# Patient Record
Sex: FEMALE | Race: WHITE | NOT HISPANIC OR LATINO | Employment: STUDENT | URBAN - METROPOLITAN AREA
[De-identification: names, ages, dates, MRNs, and addresses within clinical notes are randomized per-mention and may not be internally consistent; named-entity substitution may affect disease eponyms.]

---

## 2024-03-04 ENCOUNTER — OFFICE VISIT (OUTPATIENT)
Dept: CARDIOLOGY | Facility: CLINIC | Age: 24
End: 2024-03-04
Payer: MEDICAID

## 2024-03-04 VITALS
BODY MASS INDEX: 25.29 KG/M2 | DIASTOLIC BLOOD PRESSURE: 68 MMHG | HEIGHT: 65 IN | SYSTOLIC BLOOD PRESSURE: 114 MMHG | OXYGEN SATURATION: 98 % | HEART RATE: 83 BPM | WEIGHT: 151.8 LBS

## 2024-03-04 DIAGNOSIS — E78.00 HYPERCHOLESTEREMIA: Primary | ICD-10-CM

## 2024-03-04 PROCEDURE — 93000 ELECTROCARDIOGRAM COMPLETE: CPT | Performed by: PHYSICIAN ASSISTANT

## 2024-03-04 PROCEDURE — 99203 OFFICE O/P NEW LOW 30 MIN: CPT | Performed by: PHYSICIAN ASSISTANT

## 2024-03-04 NOTE — PROGRESS NOTES
[facility] at [facility]  INITIAL OFFICE CONSULT      Nabila Mcmahon  2000  PCP: Ronna Morgan APRN    SUBJECTIVE:   Nabila Mcmahon is a 23 y.o. 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  Patient reports her mother  in her 30s due to cardiac disease but also had a history of alcoholism.  She also reports her mother's family has a history of cardiomyopathy including her mother's mother as well.  In view of this she has been following with her primary care provider recently has had some laboratory data which revealed elevated cholesterol levels.  She was concerned about this along with her family history presents today for further evaluation.  She states that she has been doing quite well with no chest pain chest tightness dizziness near syncope or syncope.  She stays active but does not [employer] exercise regular basis.  She also just recently after having her cholesterol checked began to modify her diet she is cut out all red meat.  She has been eating fruits vegetables salads cutting down red meat she been eating fish and chicken.  She presents today for further cardiac evaluation regarding her elevated cholesterol level.      Cardiac PMH: (Old records have been reviewed and summarized below)  Dyslipidemia  Family history of cardiac disease  Tobacco use, vaping: Patient discontinued last year        Past Medical History, Past Surgical History, Family history, Social History, and Medications were all reviewed with the patient today and updated as necessary.     Current Outpatient Medications   Medication  Sig Dispense Refill    valACYclovir (VALTREX) 500 MG tablet Take 1 tablet by mouth Daily. 90 tablet 1    vitamin D3 125 MCG (5000 UT) capsule capsule Take 1 capsule by mouth Daily. 90 capsule 3     No current facility-administered medications for this visit.     No Known Allergies      Past Medical History:   Diagnosis Date    GERD (gastroesophageal reflux disease)     Headache     Herpes     genital    Urinary tract infection      Past Surgical History:   Procedure Laterality Date    TONSILLECTOMY Bilateral     WISDOM TOOTH EXTRACTION Bilateral      Family History   Problem Relation Age of Onset    Heart failure Mother     Heart disease Mother     Alcohol abuse Mother     Diabetes Father     Heart failure Maternal Grandfather     Alcohol abuse Maternal Grandfather     Diabetes Paternal Grandmother      Social History     Tobacco Use    Smoking status: Former     Current packs/day: 0.00     Types: Cigarettes     Quit date: 2023     Years since quittin.1     Passive exposure: Never    Smokeless tobacco: Never    Tobacco comments:     Use to vape   Substance Use Topics    Alcohol use: Yes     Alcohol/week: 2.0 standard drinks of alcohol     Types: 2 Glasses of wine per week       ROS:  Review of Symptoms:  General: no recent weight loss/gain, weakness or fatigue  Skin: no rashes, lumps, or other skin changes  HEENT: no dizziness, lightheadedness, or vision changes  Respiratory: no cough or hemoptysis  Cardiovascular: no palpitations, and tachycardia  Gastrointestinal: no black/tarry stools or diarrhea  Urinary: no change in frequency or urgency  Peripheral Vascular: no claudication or leg cramps  Musculoskeletal: no muscle or joint pain/stiffness  Psychiatric: no depression or excessive stress  Neurological: no sensory or motor loss, no syncope  Hematologic: no anemia, easy bruising or bleeding  Endocrine: no thyroid problems, nor heat or cold intolerance         PHYSICAL EXAM:   /68 (BP Location: Right  "arm, Patient Position: Sitting)   Pulse 83   Ht 165.1 cm (65\")   Wt 68.9 kg (151 lb 12.8 oz)   SpO2 98%   BMI 25.26 kg/m²      Wt Readings from Last 5 Encounters:   03/04/24 68.9 kg (151 lb 12.8 oz)   02/01/24 61.3 kg (135 lb 3.2 oz)     BP Readings from Last 5 Encounters:   03/04/24 114/68   02/01/24 108/70       General-Well Nourished, Well developed  Eyes - PERRLA  Neck- supple, No mass  CV- regular rate and rhythm, no MRG  Lung- clear bilaterally  Abd- soft, +BS  Musc/skel - Norm strength and range of motion  Skin- warm and dry  Neuro - Alert & Oriented x 3, appropriate mood.    Patient's external notes were reviewed.  Independent interpretation of test performed by another physician in facility were reviewed.  Outside laboratory data was also reviewed.    Medical problems and test results were reviewed with the patient today.     Results for orders placed or performed in visit on 02/01/24   CBC Auto Differential    Specimen: Blood   Result Value Ref Range    WBC 4.03 3.40 - 10.80 10*3/mm3    RBC 4.77 3.77 - 5.28 10*6/mm3    Hemoglobin 12.9 12.0 - 15.9 g/dL    Hematocrit 40.8 34.0 - 46.6 %    MCV 85.5 79.0 - 97.0 fL    MCH 27.0 26.6 - 33.0 pg    MCHC 31.6 31.5 - 35.7 g/dL    RDW 12.8 12.3 - 15.4 %    RDW-SD 39.7 37.0 - 54.0 fl    MPV 10.7 6.0 - 12.0 fL    Platelets 207 140 - 450 10*3/mm3    Neutrophil % 69.3 42.7 - 76.0 %    Lymphocyte % 20.3 19.6 - 45.3 %    Monocyte % 7.7 5.0 - 12.0 %    Eosinophil % 2.0 0.3 - 6.2 %    Basophil % 0.5 0.0 - 1.5 %    Immature Grans % 0.2 0.0 - 0.5 %    Neutrophils, Absolute 2.79 1.70 - 7.00 10*3/mm3    Lymphocytes, Absolute 0.82 0.70 - 3.10 10*3/mm3    Monocytes, Absolute 0.31 0.10 - 0.90 10*3/mm3    Eosinophils, Absolute 0.08 0.00 - 0.40 10*3/mm3    Basophils, Absolute 0.02 0.00 - 0.20 10*3/mm3    Immature Grans, Absolute 0.01 0.00 - 0.05 10*3/mm3    nRBC 0.0 0.0 - 0.2 /100 WBC   Comprehensive Metabolic Panel    Specimen: Blood   Result Value Ref Range    Glucose 85 65 - " 99 mg/dL    BUN 11 6 - 20 mg/dL    Creatinine 0.80 0.57 - 1.00 mg/dL    Sodium 139 136 - 145 mmol/L    Potassium 4.3 3.5 - 5.2 mmol/L    Chloride 103 98 - 107 mmol/L    CO2 22.4 22.0 - 29.0 mmol/L    Calcium 9.6 8.6 - 10.5 mg/dL    Total Protein 7.6 6.0 - 8.5 g/dL    Albumin 4.9 3.5 - 5.2 g/dL    ALT (SGPT) 12 1 - 33 U/L    AST (SGOT) 18 1 - 32 U/L    Alkaline Phosphatase 62 39 - 117 U/L    Total Bilirubin 0.7 0.0 - 1.2 mg/dL    Globulin 2.7 gm/dL    A/G Ratio 1.8 g/dL    BUN/Creatinine Ratio 13.8 7.0 - 25.0    Anion Gap 13.6 5.0 - 15.0 mmol/L    eGFR 106.3 >60.0 mL/min/1.73   Lipid Panel    Specimen: Blood   Result Value Ref Range    Total Cholesterol 234 (H) 0 - 200 mg/dL    Triglycerides 58 0 - 150 mg/dL    HDL Cholesterol 81 (H) 40 - 60 mg/dL    LDL Cholesterol  143 (H) 0 - 100 mg/dL    VLDL Cholesterol 10 5 - 40 mg/dL    LDL/HDL Ratio 1.75    Vitamin D,25-Hydroxy    Specimen: Blood   Result Value Ref Range    25 Hydroxy, Vitamin D 18.1 (L) 30.0 - 100.0 ng/ml   Hepatitis C Antibody    Specimen: Blood   Result Value Ref Range    Hepatitis C Ab Non-Reactive Non-Reactive   Hemoglobin A1c    Specimen: Blood   Result Value Ref Range    Hemoglobin A1C 5.00 4.80 - 5.60 %   TSH Rfx On Abnormal To Free T4    Specimen: Blood   Result Value Ref Range    TSH 0.623 0.270 - 4.200 uIU/mL         Lab Results   Component Value Date    CHOL 234 (H) 02/01/2024    HDL 81 (H) 02/01/2024     (H) 02/01/2024    VLDL 10 02/01/2024       EKG:  (EKG/Tracing has been independently visualized by me and summarized below)      ECG 12 Lead    Date/Time: 3/4/2024 10:16 AM  Performed by: Adalberto Rodrigues PA    Authorized by: Adalberto Rodrigues PA  Comparison: not compared with previous ECG   Rhythm: sinus arrhythmia  Rate: tachycardic  Conduction: conduction normal  QRS axis: normal    Clinical impression: normal ECG          ASSESSMENT   1.  Dyslipidemia: Patient has modified diet, Mediterranean style low red meat diet.  Increase  exercise.  Will recheck lipid panel in 3 months with LP(a) and CRP level.    2.  History of cardiac disease: We would like to gain further information score discussed with her aunt who may know her family history.  She does state her mother had alcoholism and she  her sleep there is was an autopsy performed.  She is unsure of her other family history.  She will try to further investigate this at this time.      PLAN  Continue Mediterranean style diet, recheck lipid panel 3 months with CRP and lipoprotein a.  We discussed option of starting medication such as a statin.  She elected for now try diet initially.  She will focus on her lifestyle avoid vaping, smoking increase exercise.  Follow-up regarding her family history determine if any further testing should be considered.  Return follow-up 3 months or sooner as needed.         Cardiology/Electrophysiology  24  10:14 EST  Electronically signed by RITO Booth, 24, 10:17 AM EST.

## 2024-03-15 ENCOUNTER — OFFICE VISIT (OUTPATIENT)
Age: 24
End: 2024-03-15
Payer: MEDICAID

## 2024-03-15 DIAGNOSIS — F41.9 ANXIETY DISORDER, UNSPECIFIED TYPE: ICD-10-CM

## 2024-03-15 DIAGNOSIS — F32.A DEPRESSION, UNSPECIFIED DEPRESSION TYPE: Primary | ICD-10-CM

## 2024-03-15 NOTE — PROGRESS NOTES
"     Initial Adult Note     Date:03/15/2024   Client Name: Nabila Mcmahon  : 2000   MRN: [id number]   Time IN: 2:15pm    Time OUT: 2:50pm     Referring Provider: Ronna Morgan APRN    Chief Complaint:      ICD-10-CM ICD-9-CM   1. Depression, unspecified depression type  F32.A 311   2. Anxiety disorder, unspecified type  F41.9 300.00        History of Present Illness:   Nabila Mcmahon is a 23 y.o. female who is being seen today for an initial psychotherapy counseling assessment for increasing awareness of depressive and anxious symptoms. Pt noted that periods of depression started about 2-3 years ago.  Pt notes that she will experience 1 week or more of increased depressive symptoms including numerous negative thoughts, lack of interest or energy in activities, isolating self from others, and feeling a \"gray cloud\" around her.  Pt reported feelings of anxiety related to new situations/environments exhibited through feelings of nervousness, worry and on occasion physical symptoms such as racing heart, breathlessness and chest pains.  Pt expressed difficulty with sleeping.  Pt states that she has noted symptoms have been present for several years but was unable to obtain services due to lack of health insurance.  Pt shared that she lived in [place] for several years during adolescence and moved around since then - most recently was living in [place] and moved to [place] in 2023.        Past Psychiatric History:   None noted    Objective     PHQ-9 Depression Screening  Little interest or pleasure in doing things? 1-->several days   Feeling down, depressed, or hopeless? 1-->several days   Trouble falling or staying asleep, or sleeping too much? 1-->several days   Feeling tired or having little energy? 1-->several days   Poor appetite or overeating? 0-->not at all   Feeling bad about yourself - or that you are a failure or have let yourself or your family down? 0-->not at all   Trouble concentrating on things, " such as reading the newspaper or watching television? 1-->several days   Moving or speaking so slowly that other people could have noticed? Or the opposite - being so fidgety or restless that you have been moving around a lot more than usual? 0-->not at all   Thoughts that you would be better off dead, or of hurting yourself in some way? 0-->not at all   PHQ-9 Total Score 5   If you checked off any problems, how difficult have these problems made it for you to do your work, take care of things at home, or get along with other people? somewhat difficult       JOHANNA-7  Feeling nervous, anxious or on edge: Nearly every day  Not being able to stop or control worrying: More than half the days  Worrying too much about different things: More than half the days  Trouble Relaxing: More than half the days  Being so restless that it is hard to sit still: Not at all  Feeling afraid as if something awful might happen: Not at all  Becoming easily annoyed or irritable: More than half the days  JOHANNA 7 Total Score: 11  If you checked any problems, how difficult have these problems made it for you to do your work, take care of things at home, or get along with other people: Very difficult    Interpersonal/Relational:  Marital Status:  - Nov 2023; in a relationship with  for 7-8 years, no kids  Support system: patient siblings - twin sister, lives locally      Work History:   Highest level of education obtained: college - Will graduate in June with Bachelors in Psychology from [employer]  Client's occupation: Not working at this time.   Ever been active duty in the ? no      Mental/Behavioral Health History:  Past diagnoses: None noted  History or Active MH treatment: None noted - PCP is not prescribing medications at this time for behavioral health issues.    Are there any significant health issues (current or past): Low Vit D level and was prescribed supplement.   History of seizures:  "no    Family Psychiatric History:  Mother - Bipolar, unsure of I or II    History of Substance Use:  Client History:  Pt reports less than 1 drink a week on average.  Pt reports quitting vaping in .  Denied history of or current use of tobacco use.  Pt reported history of use of marijuana during younger years; denied current use.  Pt denied current or history of use of other illicit drigs.  Caffeine intake in minimal.    Family History: Pt reported mother and maternal grandfather were alcoholics.       Lifestyle:  Current hobbies include:Pt enjoys reading and doing arts/crafts.  Pt and  enjoy cooking, playing tennis and going on walks.  Pt and sister enjoy shopping together and doing arts/crafts.     Strengths/Current Coping Strategies: Pt has utilized journaling and engaging in self-affirmations as \"preventative\" measures when noticing onset of negative thought patterns.  Pt states that she has been unable to identify coping strategies to assist \"in the moment\" of depressed state.      Significant Life Events:   Verbal, physical, sexual abuse? no  Has client experienced a death / loss of relationship? Yes - Mother passed away in .  Pt reports that relationship with aunt (maternal side) has dwindled since moving away and is not as close as they once were).    Has client experienced a major accident or tragic events? no    Triggers: (Persons/Places/Things/Events/Thought/Emotions): Yelling (father)    Legal History:  The patient has no significant history of legal issues.    Social History:   Social History     Socioeconomic History    Marital status:    Tobacco Use    Smoking status: Former     Current packs/day: 0.00     Types: Cigarettes     Quit date: 2023     Years since quittin.2     Passive exposure: Never    Smokeless tobacco: Never    Tobacco comments:     Use to vape   Vaping Use    Vaping status: Never Used   Substance and Sexual Activity    Alcohol use: Yes     Alcohol/week: " 2.0 standard drinks of alcohol     Types: 2 Glasses of wine per week    Drug use: Never     Types: Marijuana    Sexual activity: Yes     Partners: Male     Birth control/protection: Condom        Past Medical History:   Past Medical History:   Diagnosis Date    GERD (gastroesophageal reflux disease)     Headache     Herpes     genital    Urinary tract infection        Past Surgical History:   Past Surgical History:   Procedure Laterality Date    TONSILLECTOMY Bilateral     WISDOM TOOTH EXTRACTION Bilateral        Family History:   Family History   Problem Relation Age of Onset    Heart failure Mother     Heart disease Mother     Alcohol abuse Mother     Diabetes Father     Heart failure Maternal Grandfather     Alcohol abuse Maternal Grandfather     Diabetes Paternal Grandmother        Medications:     Current Outpatient Medications:     valACYclovir (VALTREX) 500 MG tablet, Take 1 tablet by mouth Daily., Disp: 90 tablet, Rfl: 1    vitamin D3 125 MCG (5000 UT) capsule capsule, Take 1 capsule by mouth Daily., Disp: 90 capsule, Rfl: 3    Allergies:   No Known Allergies    Subjective     Mental Status Exam:   MENTAL STATUS EXAM   General Appearance:  Cleanly groomed and dressed  Eye Contact:  Good eye contact  Attitude:  Cooperative  Motor Activity:  Normal gait, posture  Speech:  Normal rate, tone, volume  Mood and affect:  Normal, pleasant  Hopelessness:  2  Loneliness: Denies  Thought Process:  Logical and goal-directed  Associations/ Thought Content:  No delusions  Hallucinations:  None  Suicidal Ideations:  Not present  Homicidal Ideation:  Not present  Sensorium:  Alert  Orientation:  Person, time, situation and place  Immediate Recall, Recent, and Remote Memory:  Intact  Attention Span/ Concentration:  Good  Insight:  Good  Judgement:  Good  Reliability:  Good  Impulse Control:  Good      Assessment / Plan      Visit Diagnosis/Orders Placed This Visit:    ICD-10-CM ICD-9-CM   1. Depression, unspecified  "depression type  F32.A 311   2. Anxiety disorder, unspecified type  F41.9 300.00        SUICIDE RISK ASSESSMENT/CSSRS:  1. Does client have thoughts of suicide? Yes Pt acknowledged thoughts of \"being better of dead\" or \"would anyone care\" but denied that thoughts have lead to intention or plan.    2. Does client have intent for suicide? Patient denies   3. Does client have a current plan for suicide? Patient denies   4. History of suicide attempts: Patient denies   5. Family history of suicide or attempts: Patient denies   6. History of violent behaviors towards others or property or thoughts of committing suicide: Patient denies   7. History of sexual aggression toward others: Patient denies   8. Access to firearms or weapons: Patient denies     PLAN:  Safety: No acute safety concerns  Risk Assessment: Risk of self-harm acutely is low. Risk of self-harm chronically is also low, but could be further elevated in the event of treatment noncompliance and/or AODA.    Treatment Plan/ Short and Long Term Goals: Continue supportive psychotherapy efforts and medications as indicated. Treatment options discussed during today's visit. Client acknowledged and verbally consented to continue with current treatment plan and was educated on the importance of compliance with treatment and follow-up appointments. Client seems reasonably able to adhere to treatment plan.      Assisted client in processing above session content; acknowledged and normalized client’s thoughts, feelings, and concerns.  Rationalized client's thought process regarding awareness of depressive and anxiety symptoms. Pt and therapist discussed goals of care to include develop of nonjudgmental awareness of emotions.  Therapist and pt will explore awareness of cognitive distortions and develop challenges to assist with development of more helpful thought patterns.  Pt and therapist will engage in behavioral activation strategies to assist with depressive " regulation.  Therapist and pt will explore and developing coping and distress tolerance skills to assist with emotional regulation during heightened emotional states.        Allowed client to freely discuss issues without interruption or judgement with unconditional positive regard, active listening skills, and empathy. Clinician provided a safe, confidential environment to facilitate the development of a positive therapeutic relationship and encouraged open, honest communication. Assisted client in identifying risk factors which would indicate the need for higher level of care including thoughts to harm self or others and/or self-harming behavior and encouraged client to contact this office, call 911, or present to the nearest emergency room should any of these events occur. Discussed crisis intervention services and means to access. Client adamantly and convincingly denies current suicidal or homicidal ideation or perceptual disturbance. Assisted client in processing session content; acknowledged and normalized client’s thoughts, feelings, and concerns by utilizing a person-centered approach in efforts to build appropriate rapport and a positive therapeutic relationship with open and honest communication.     Quality Measures:     TOBACCO USE:  Tobacco Use: Medium Risk (3/4/2024)    Patient History     Smoking Tobacco Use: Former     Smokeless Tobacco Use: Never     Passive Exposure: Never      Former smoker    I advised Nabila of the risks of tobacco use.     Follow Up:   Return in about 3 weeks (around 4/5/2024).      Sydney Norman LCSW  [facility] [address]

## 2024-04-05 ENCOUNTER — OFFICE VISIT (OUTPATIENT)
Age: 24
End: 2024-04-05
Payer: MEDICAID

## 2024-04-05 DIAGNOSIS — F41.1 GENERALIZED ANXIETY DISORDER: ICD-10-CM

## 2024-04-05 DIAGNOSIS — F33.0 MILD EPISODE OF RECURRENT MAJOR DEPRESSIVE DISORDER: Primary | ICD-10-CM

## 2024-04-05 NOTE — PROGRESS NOTES
"     Follow Up Adult Note     Date:2024   Client Name: Nabila Mcmahon  : 2000   MRN: [id number]   Time IN: 1:30pm    Time OUT: 2:12pm     Referring Provider: Ronna Morgan APRN    Chief Complaint:      ICD-10-CM ICD-9-CM   1. Mild episode of recurrent major depressive disorder  F33.0 296.31   2. Generalized anxiety disorder  F41.1 300.02        History of Present Illness:   Nabila Mcmahon is a 23 y.o. female who is being seen today for follow up individual psychotherapy counseling ongoing depression and anxiety symptoms.  Pt shared that she recently noticed significant anxiety during encounters with her in-laws.  Pt reportedly difficulty with initiating conversations with family members and feeling uncomfortable without her  present.  Pt acknowledged self-defeating or thoughts of comparison as catalyst for anxiety. Pt states that feelings of anxiety are generally exhibited through racing thoughts, shortness of breath, and chest pressure.  Pt states that she feels \"frozen\" and unable to speak.         Objective     PHQ-9 Depression Screening  Little interest or pleasure in doing things? 1-->several days   Feeling down, depressed, or hopeless? 1-->several days   Trouble falling or staying asleep, or sleeping too much? 1-->several days   Feeling tired or having little energy? 1-->several days   Poor appetite or overeating? 0-->not at all   Feeling bad about yourself - or that you are a failure or have let yourself or your family down? 1-->several days   Trouble concentrating on things, such as reading the newspaper or watching television? 1-->several days   Moving or speaking so slowly that other people could have noticed? Or the opposite - being so fidgety or restless that you have been moving around a lot more than usual? 0-->not at all   Thoughts that you would be better off dead, or of hurting yourself in some way? 0-->not at all   PHQ-9 Total Score 6   If you checked off any problems, how " difficult have these problems made it for you to do your work, take care of things at home, or get along with other people? not difficult at all      JOHANNA-7  Feeling nervous, anxious or on edge: More than half the days  Not being able to stop or control worrying: More than half the days  Worrying too much about different things: More than half the days  Trouble Relaxing: Several days  Being so restless that it is hard to sit still: Several days  Feeling afraid as if something awful might happen: Not at all  Becoming easily annoyed or irritable: Nearly every day  JOHANNA 7 Total Score: 11  If you checked any problems, how difficult have these problems made it for you to do your work, take care of things at home, or get along with other people: Somewhat difficult      Medications:     Current Outpatient Medications:     valACYclovir (VALTREX) 500 MG tablet, Take 1 tablet by mouth Daily., Disp: 90 tablet, Rfl: 1    vitamin D3 125 MCG (5000 UT) capsule capsule, Take 1 capsule by mouth Daily., Disp: 90 capsule, Rfl: 3    Allergies:   No Known Allergies      Subjective     Mental Status Exam:   MENTAL STATUS EXAM   General Appearance:  Cleanly groomed and dressed  Eye Contact:  Good eye contact  Attitude:  Cooperative and polite  Motor Activity:  Normal gait, posture  Speech:  Normal rate, tone, volume  Mood and affect:  Normal, pleasant  Thought Process:  Logical and goal-directed  Associations/ Thought Content:  No delusions  Hallucinations:  None  Suicidal Ideations:  Not present  Homicidal Ideation:  Not present  Sensorium:  Alert and clear  Orientation:  Person, place, time and situation  Immediate Recall, Recent, and Remote Memory:  Intact  Attention Span/ Concentration:  Good  Insight:  Good  Judgement:  Good  Reliability:  Good  Impulse Control:  Good       Client's Support Network Includes:      Functional Status: Mild impairment     Progress toward goal: Not at goal    Prognosis: Good with Ongoing Treatment  "    Assessment / Plan / Progress     Visit Diagnosis/Orders Placed This Visit:    ICD-10-CM ICD-9-CM   1. Mild episode of recurrent major depressive disorder  F33.0 296.31   2. Generalized anxiety disorder  F41.1 300.02        SUICIDE RISK ASSESSMENT/CSSRS:  1. Does client have thoughts of suicide? Patient denies  2. Does client have intent for suicide? Patient denies  3. Does client have a current plan for suicide? Patient denies   4. History of suicide attempts: Patient denies   5. Family history of suicide or attempts: Patient denies   6. History of violent behaviors towards others or property or thoughts of committing suicide: Patient denies   7. History of sexual aggression toward others: Patient denies   8. Access to firearms or weapons: Patient denies     PLAN:  Safety: No acute safety concerns  Risk Assessment: Risk of self-harm acutely is low. Risk of self-harm chronically is also low, but could be further elevated in the event of treatment noncompliance and/or AODA.    Treatment Plan/Goals & Progress: Continue supportive psychotherapy efforts and medications as indicated. Treatment options discussed during today's visit. Client ackowledged and verbally consented to continue with current treatment plan and was educated on the importance of compliance with treatment and follow-up appointments. Client seems reasonably able to adhere to treatment plan.      Assisted client in processing above session content; acknowledged and normalized client’s thoughts, feelings, and concerns.  Rationalized client's thought process regarding awareness of anxiety and thought processing of recent encounter. Pt and therapist explored pt's underlying thoughts/beliefs associated with anxious thoughts and fear of conversating with others.  Pt acknowledged awareness of \"different mask\" presented to family as opposed to genuine self. Therapist provided psycho education on automatic stress response system and development of grounding " skills based on 5 senses to gain control of stressed state. Therapist and pt then discussed cognitive distortions and challenges to present to self to more cognitively think through processing of anxious situation. Therapist suggested pt consider fear hierarchy to gradually expose self to encounters to develop awareness of anxiety and gain confidence to support pt's positive beliefs in self. Therapist and pt discussed pt's awareness of increasing self graciousness that has developed over time and how that has been beneficial to accepting pt's states of anxiety and depression. Therapist introduced and discussed concept of inner critic vs inner  to further support pt's awareness of positive self-development.     Allowed client to freely discuss issues without interruption or judgement with unconditional positive regard, active listening skills, and empathy. Clinician provided a safe, confidential environment to facilitate the development of a positive therapeutic relationship and encouraged open, honest communication. Assisted client in identifying risk factors which would indicate the need for higher level of care including thoughts to harm self or others and/or self-harming behavior and encouraged client to contact this office, call 911, or present to the nearest emergency room should any of these events occur. Discussed crisis intervention services and means to access. Client adamantly and convincingly denies current suicidal or homicidal ideation or perceptual disturbance. Assisted client in processing session content; acknowledged and normalized client’s thoughts, feelings, and concerns by utilizing a person-centered approach in efforts to build appropriate rapport and a positive therapeutic relationship with open and honest communication.     Quality Measures:     TOBACCO USE:  Tobacco Use: Medium Risk (3/4/2024)    Patient History     Smoking Tobacco Use: Former     Smokeless Tobacco Use: Never     Passive  Exposure: Never      Former smoker    I advised Nabila of the risks of tobacco use.     Follow Up:   Return in about 1 month (around 5/5/2024).    Sydney Norman LCSW  [facility]

## 2024-06-11 PROBLEM — B00.9 HSV (HERPES SIMPLEX VIRUS) INFECTION: Status: ACTIVE | Noted: 2024-06-11

## 2024-06-11 PROBLEM — Z01.419 NORMAL GYNECOLOGIC EXAMINATION: Status: ACTIVE | Noted: 2024-06-11

## 2024-06-11 NOTE — PROGRESS NOTES
Subjective   Chief Complaint   Patient presents with    Gynecologic Exam     Nabila Mcmahon is a 23 y.o. year old  presenting to be seen for her annual exam.  She has not had a Pap in the past.  She is interested in discussing contraceptive methods.  She is currently using condoms.  She has history of hsv, takes acyclovir for suppression. She is unsure if she has had her hpv vaccine series.   She lives here in [place]. 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    Her  is a .   SEXUAL Hx:  She is currently sexually active.  In the past year there there has been NO new sexual partners.    Condoms are always used.  She would like to be screened for STD's at today's exam.  Current birth control method: condoms.  She is happy with her current method of contraception and does not want to discuss alternative methods of contraception.  MENSTRUAL Hx:  Patient's last menstrual period was 2024.  In the past 6 months her cycles have been regular, predictable and occur monthly.  Her menstrual flow is typically normal.   Each month on average there are roughly 0 day(s) of very heavy flow.    Intermenstrual bleeding is absent.    Post-coital bleeding is absent.  Dysmenorrhea: mild and is not affecting her activities of daily living  PMS: moderate and is not affecting her activities of daily living  Her cycles are not a source of concern for her that she wishes to discuss today.  HEALTH Hx:  She exercises regularly: yes. Walking daily   She wears her seat belt: yes.  She has concerns about domestic violence: no.  OTHER THINGS SHE WANTS TO DISCUSS TODAY:  Nothing else    The following portions of the patient's history were reviewed and updated as appropriate:problem list, current medications, allergies, past family history, past medical history, past social history, and past surgical history.    Social History    Tobacco Use      Smoking status: Former        Packs/day:  0.00        Types: Cigarettes        Quit date: 2023        Years since quittin.4        Passive exposure: Never      Smokeless tobacco: Never      Tobacco comments: Use to vape    Review of Systems  Constitutional POS: nothing reported    NEG: anorexia or night sweats   Genitourinary POS: nothing reported    NEG: dysuria or hematuria      Gastointestinal POS: nothing reported    NEG: bloating, change in bowel habits, melena, or reflux symptoms   Integument POS: nothing reported    NEG: moles that are changing in size, shape, color or rashes   Breast POS: nothing reported    NEG: persistent breast lump, skin dimpling, or nipple discharge        Objective   /70   Resp 14   Wt 64 kg (141 lb)   LMP 2024   BMI 23.46 kg/m²     General:  well developed; well nourished  no acute distress   Skin:  No suspicious lesions seen   Thyroid: normal to inspection and palpation   Breasts:  Examined in supine position  Symmetric without masses or skin dimpling  Nipples normal without inversion, lesions or discharge  There are no palpable axillary nodes   Abdomen: soft, non-tender; no masses  no umbilical or inguinal hernias are present  no hepato-splenomegaly   Pelvis: Clinical staff was present for exam  :  urethral meatus normal;  Vaginal:  normal pink mucosa without prolapse or lesions.  Cervix:  normal appearance.  Uterus:  normal size, shape and consistency.  Adnexa:  normal bimanual exam of the adnexa.  Rectal:  digital rectal exam not performed; anus visually normal appearing.        Assessment   Well woman with routine gynecological exam  HSV-on valacyclovir for suppression PCP refills this medication.     Plan     Pap and STD testing was done today.  If she does not receive the results of the Pap within 2 weeks  time, she was instructed to call to find out the results.  I explained to Nabila that the recommendations for Pap smear interval in a low risk patient's has lengthened to 3 years time.  I  encouraged her to be seen yearly for a full physical exam including breast and pelvic exam even during the off years when PAP's will not be performed.  No prescription was given or electronically sent at today's visit  The importance of keeping all planned follow-up and taking all medications as prescribed was emphasized.  Today I discussed with Nabila the total recommended calcium intake for a premenopausal female is 1000 mg.  Ideally this should be from dietary sources.  I reviewed calcium content in various foods including milk, fortified orange juice and yogurt.  If she cannot get sufficient calcium through dietary means, it is recommended to supplement with either a multivitamin or calcium to reach her daily goal.  I also reviewed the difference in the bioavailability of calcium carbonate and calcium citrate containing supplements and the importance of taking calcium carbonate containing products with food.  Finally, vitamin D's role in calcium absorption was reviewed and a total daily vitamin D intake of 800 units was recommended.  I discussed with Nabila that she may be behind on needed vaccinations for HPV (Gardasil-9).  She may be able to obtain these vaccinations at her local pharmacy OR speak about obtaining them with her primary care.  If she does obtain her vaccines, I have asked Nabila to let us know the date each vaccine was obtained so that her medical record could be updated in our system.  Follow up for annual exam 1 year    No orders of the defined types were placed in this encounter.         This note was electronically signed.    Siria Mcdonald, ROD  June 14, 2024

## 2024-06-14 ENCOUNTER — OFFICE VISIT (OUTPATIENT)
Dept: OBSTETRICS AND GYNECOLOGY | Facility: CLINIC | Age: 24
End: 2024-06-14
Payer: MEDICAID

## 2024-06-14 VITALS
RESPIRATION RATE: 14 BRPM | DIASTOLIC BLOOD PRESSURE: 70 MMHG | WEIGHT: 141 LBS | BODY MASS INDEX: 23.46 KG/M2 | SYSTOLIC BLOOD PRESSURE: 118 MMHG

## 2024-06-14 DIAGNOSIS — Z01.419 WELL WOMAN EXAM WITH ROUTINE GYNECOLOGICAL EXAM: Primary | ICD-10-CM

## 2024-06-21 LAB — REF LAB TEST METHOD: NORMAL

## 2024-07-27 DIAGNOSIS — A60.09 HERPES GENITALIS IN WOMEN: ICD-10-CM

## 2024-07-29 RX ORDER — VALACYCLOVIR HYDROCHLORIDE 500 MG/1
500 TABLET, FILM COATED ORAL DAILY
Qty: 90 TABLET | Refills: 4 | Status: SHIPPED | OUTPATIENT
Start: 2024-07-29

## 2024-07-30 RX ORDER — VALACYCLOVIR HYDROCHLORIDE 500 MG/1
500 TABLET, FILM COATED ORAL DAILY
Qty: 90 TABLET | Refills: 1 | OUTPATIENT
Start: 2024-07-30

## 2024-10-15 ENCOUNTER — OFFICE VISIT (OUTPATIENT)
Age: 24
End: 2024-10-15
Payer: MEDICAID

## 2024-10-15 DIAGNOSIS — F33.0 MILD EPISODE OF RECURRENT MAJOR DEPRESSIVE DISORDER: Primary | ICD-10-CM

## 2024-10-15 DIAGNOSIS — F41.1 GENERALIZED ANXIETY DISORDER: ICD-10-CM

## 2024-10-15 NOTE — TREATMENT PLAN
Multi-Disciplinary Problems (from Behavioral Health Treatment Plan)      Active Problems       Problem: Anxiety  Start Date: 10/15/24      Problem Details: The patient self-scales this problem as a 9 with 10 being the worst.          Goal Priority Start Date Expected End Date End Date    Patient will develop and implement behavioral and cognitive strategies to reduce anxiety and irrational fears. -- 10/15/24 04/15/25 --    Goal Details: Progress toward goal:  Not appropriate to rate progress toward goal since this is the initial treatment plan.          Goal Intervention Frequency Start Date End Date    Help patient explore past emotional issues in relation to present anxiety. Q3 Months 10/15/24 --    Intervention Details: Duration of treatment until discharged.          Goal Intervention Frequency Start Date End Date    Help patient develop an awareness of their cognitive and physical responses to anxiety. Q3 Months 10/15/24 --    Intervention Details: Duration of treatment until discharged.                  Problem: Depression  Start Date: 10/15/24      Problem Details: The patient self-scales this problem as a 7 with 10 being the worst.          Goal Priority Start Date Expected End Date End Date    Patient will demonstrate the ability to initiate new constructive life skills outside of sessions on a consistent basis. -- 10/15/24 04/15/25 --    Goal Details: Progress toward goal:  Not appropriate to rate progress toward goal since this is the initial treatment plan.          Goal Intervention Frequency Start Date End Date    Assist patient in setting attainable activities of daily living goals. Q3 Months 10/15/24 --      Goal Intervention Frequency Start Date End Date    Provide education about depression Q3 Months 10/15/24 --    Intervention Details: Duration of treatment until discharged.          Goal Intervention Frequency Start Date End Date    Assist patient in developing healthy coping strategies. Q3  Months 10/15/24 --    Intervention Details: Duration of treatment until discharged.                  Problem: Self Esteem  Start Date: 10/15/24      Problem Details: The patient self-scales this problem as a 7 with 10 being the worst.          Goal Priority Start Date Expected End Date End Date    Patient will demonstrate the ability to internalize positive cognitive messages that is also reflected in behavioral changes. -- 10/15/24 04/15/25 --    Goal Details: Progress toward goal:  Not appropriate to rate progress toward goal since this is the initial treatment plan.          Goal Intervention Frequency Start Date End Date    Assist patient in identifying distorted negative beliefs about self and the world. Q3 Months 10/15/24 --    Intervention Details: Duration of treatment until discharged.          Goal Intervention Frequency Start Date End Date    Reinforce use of more realistic positive messages about to self in interpreting life events. Q3 Months 10/15/24 --    Intervention Details: Duration of treatment until discharged.                          Reviewed By       Sydney Norman LCSW 10/15/24 [id number]                     I have discussed and reviewed this treatment plan with the patient.

## 2024-10-15 NOTE — PROGRESS NOTES
Follow Up Adult Note     Date:10/15/2024   Client Name: Nabila Mcmahon  : 2000   MRN: [id number]   Time IN: 11:00 am    Time OUT: 11:49 am     Referring Provider: Ronna Morgan APRN    Chief Complaint:      ICD-10-CM ICD-9-CM   1. Mild episode of recurrent major depressive disorder  F33.0 296.31   2. Generalized anxiety disorder  F41.1 300.02        History of Present Illness:   Nabila Mcmahon is a 24 y.o. female who is being seen today for follow up individual psychotherapy counseling due to increasing symptoms of anxiety and depression. Pt reports that she has noted an increase in racing thoughts, inability to control worry, feeling nauseous or ill and feeling down/depressed for past few months. Pt acknowledged that she believes that symptoms are connected to difficult job situation which she just resigned. Pt acknowledged ongoing difficulties with social settings and with communicating openly with others. Pt acknowledged feelings of self-doubt and negative self-talk that contributes towards low feelings of self and sadness.       Objective     Medications:     Current Outpatient Medications:     valACYclovir (Valtrex) 500 MG tablet, Take 1 tablet by mouth Daily., Disp: 90 tablet, Rfl: 4    vitamin D3 125 MCG (5000 UT) capsule capsule, Take 1 capsule by mouth Daily., Disp: 90 capsule, Rfl: 3    Allergies:   No Known Allergies    Mental Status Exam:   MENTAL STATUS EXAM   General Appearance:  Cleanly groomed and dressed  Eye Contact:  Good eye contact  Attitude:  Cooperative and polite  Motor Activity:  Normal gait, posture  Speech:  Normal rate, tone, volume  Mood and affect:  Normal, pleasant  Thought Process:  Logical, goal-directed and linear  Associations/ Thought Content:  No delusions  Hallucinations:  None  Suicidal Ideations:  Not present  Homicidal Ideation:  Not present  Sensorium:  Alert  Orientation:  Person, place, time and situation  Immediate Recall, Recent, and Remote Memory:   Intact  Attention Span/ Concentration:  Good  Insight:  Good  Judgement:  Good  Reliability:  Good  Impulse Control:  Good       SUICIDE RISK ASSESSMENT/CSSRS:  1. Does client have thoughts of suicide? Patient denies  2. Does client have intent for suicide? Patient denies  3. Does client have a current plan for suicide? Patient denies   4. History of suicide attempts: Patient denies   5. Family history of suicide or attempts: Patient denies   6. History of violent behaviors towards others or property or thoughts of committing suicide: Patient denies   7. History of sexual aggression toward others: Patient denies   8. Access to firearms or weapons: Patient denies     Subjective     PHQ-9 Depression Screening  Little interest or pleasure in doing things? (Patient-Rptd) Several days   Feeling down, depressed, or hopeless? (Patient-Rptd) Over half   Trouble falling or staying asleep, or sleeping too much? (Patient-Rptd) Not at all   Feeling tired or having little energy? (Patient-Rptd) Several days   Poor appetite or overeating? (Patient-Rptd) Not at all   Feeling bad about yourself - or that you are a failure or have let yourself or your family down? (Patient-Rptd) Almost all   Trouble concentrating on things, such as reading the newspaper or watching television? (Patient-Rptd) Almost all   Moving or speaking so slowly that other people could have noticed? Or the opposite - being so fidgety or restless that you have been moving around a lot more than usual? (Patient-Rptd) Not at all   Thoughts that you would be better off dead, or of hurting yourself in some way? (Patient-Rptd) Not at all   PHQ-9 Total Score (Patient-Rptd) 10   If you checked off any problems, how difficult have these problems made it for you to do your work, take care of things at home, or get along with other people? (Patient-Rptd) Extremely difficult          JOHANNA-7  Feeling nervous, anxious or on edge: (Patient-Rptd) Nearly every day  Not being able  to stop or control worrying: (Patient-Rptd) Nearly every day  Worrying too much about different things: (Patient-Rptd) Nearly every day  Trouble Relaxing: (Patient-Rptd) Nearly every day  Being so restless that it is hard to sit still: (Patient-Rptd) Several days  Feeling afraid as if something awful might happen: (Patient-Rptd) Not at all  Becoming easily annoyed or irritable: (Patient-Rptd) Nearly every day  JOHANNA 7 Total Score: (Patient-Rptd) 16  If you checked any problems, how difficult have these problems made it for you to do your work, take care of things at home, or get along with other people: (Patient-Rptd) Extremely difficult      Client's Support Network Includes:      Functional Status: Mild impairment     Progress toward goal: Not at goal    Prognosis: Good with Ongoing Treatment     Assessment / Plan / Progress     Visit Diagnosis/Orders Placed This Visit:    ICD-10-CM ICD-9-CM   1. Mild episode of recurrent major depressive disorder  F33.0 296.31   2. Generalized anxiety disorder  F41.1 300.02          PLAN:  Safety: No acute safety concerns  Risk Assessment: Risk of self-harm acutely is low. Risk of self-harm chronically is also low, but could be further elevated in the event of treatment noncompliance and/or AODA.    Treatment Plan/Goals & Progress: Continue supportive psychotherapy efforts and medications as indicated. Treatment options discussed during today's visit. Client ackowledged and verbally consented to continue with current treatment plan and was educated on the importance of compliance with treatment and follow-up appointments. Client seems reasonably able to adhere to treatment plan.      Assisted client in processing above session content; acknowledged and normalized client’s thoughts, feelings, and concerns.  Rationalized client's thought process regarding awareness of increase of symptoms and potential connection with difficult job situation. Therapist assisted pt in  "understanding anxiety as nervous system response to threat perceived potentially during uncomfortable and dangerous job setting. Therapist and pt reviewing grounding techniques through breathing exercises and sensory engagement to assist with emotional regulation. Pt acknowledged use of positive affirmation and scripture reading as coping tools when feeling anxious. Therapist and pt explored additional areas of insecurity that are related to self-doubt in social settings. Therapist provided supportive, empathetic listening as pt recalled childhood experience of moving around a lot and not being able to develop strong relationships or connections with others which may have contributed to tendency to withdraw. Pt acknowledged expectation for \"dream person\" that she seeks to attain but is upset when she is unable change. Therapist introduced theme of acceptance to assist pt in identify self-compassion and self-awareness. Therapist encouraged pt to reflect upon values to assist with guiding decision making and support for particular areas of focus of change. Therapist encouraged pt to consider implementing small changes in communication to assist with developing skill and comfort level with integrating new strategies.       Allowed client to freely discuss issues without interruption or judgement with unconditional positive regard, active listening skills, and empathy. Clinician provided a safe, confidential environment to facilitate the development of a positive therapeutic relationship and encouraged open, honest communication. Assisted client in identifying risk factors which would indicate the need for higher level of care including thoughts to harm self or others and/or self-harming behavior and encouraged client to contact this office, call 911, or present to the nearest emergency room should any of these events occur. Discussed crisis intervention services and means to access. Client adamantly and convincingly " denies current suicidal or homicidal ideation or perceptual disturbance. Assisted client in processing session content; acknowledged and normalized client’s thoughts, feelings, and concerns by utilizing a person-centered approach in efforts to build appropriate rapport and a positive therapeutic relationship with open and honest communication.     Quality Measures:     TOBACCO USE:  Tobacco Use: Medium Risk (6/14/2024)    Patient History     Smoking Tobacco Use: Former     Smokeless Tobacco Use: Never     Passive Exposure: Never      Former smoker    I advised Nabila of the risks of tobacco use.     Follow Up:   Return in about 2 weeks (around 10/29/2024).    Sydney Norman LCSW  [facility] [address]

## 2024-10-15 NOTE — PLAN OF CARE
Goals of care were discussed to include development of coping and distress tolerance skills to assist with emotional regulation. Pt will engage in behavioral activation strategies to assist with decrease depressive symptoms. Pt will explore self-compassion strategies and identify personal values to assist with improving self-esteem and self-awareness.

## 2025-04-11 ENCOUNTER — TELEMEDICINE (OUTPATIENT)
Dept: FAMILY MEDICINE CLINIC | Facility: TELEHEALTH | Age: 25
End: 2025-04-11
Payer: MEDICAID

## 2025-04-11 DIAGNOSIS — Z87.898 HISTORY OF MOTION SICKNESS: Primary | ICD-10-CM

## 2025-04-11 RX ORDER — DIMENHYDRINATE 50 MG
50 TABLET ORAL EVERY 8 HOURS PRN
Qty: 24 TABLET | Refills: 0 | Status: SHIPPED | OUTPATIENT
Start: 2025-04-11

## 2025-04-11 RX ORDER — DIMENHYDRINATE 50 MG
50 TABLET ORAL EVERY 8 HOURS PRN
Qty: 24 TABLET | Refills: 0 | Status: SHIPPED | OUTPATIENT
Start: 2025-04-11 | End: 2025-04-11

## 2025-04-11 NOTE — PROGRESS NOTES
Mode of Visit: Video  Location of patient: -HOME-  Location of provider: +HOME+  You have chosen to receive care through a telehealth visit.  The patient has signed the video visit consent form.  The visit included audio and video interaction. No technical issues occurred during this visit.    HPI  Nabila Mcmahon is a 24 y.o. female  presents requesting a prescription for motion sickness.  She reports that she gets motion sickness when in a car and on planes and that she is going on a cruise soon and just wants to have medication in case she gets motion sickness.  She has done some research and would like the pills instead of the patch.    Review of Systems   Constitutional:  Negative for fever.   HENT:  Negative for congestion.    Respiratory:  Negative for cough.    Gastrointestinal:  Negative for nausea.   Neurological:  Negative for headaches.       Past Medical History:   Diagnosis Date    GERD (gastroesophageal reflux disease)     Headache     Herpes     genital    Urinary tract infection        Family History   Problem Relation Age of Onset    Heart failure Mother     Heart disease Mother     Alcohol abuse Mother     Diabetes Father     Heart failure Maternal Grandfather     Alcohol abuse Maternal Grandfather     Diabetes Paternal Grandmother        Social History     Socioeconomic History    Marital status:    Tobacco Use    Smoking status: Former     Current packs/day: 0.00     Types: Cigarettes     Quit date: 2023     Years since quittin.2     Passive exposure: Never    Smokeless tobacco: Never    Tobacco comments:     Use to vape   Vaping Use    Vaping status: Former   Substance and Sexual Activity    Alcohol use: Yes     Alcohol/week: 2.0 standard drinks of alcohol     Types: 2 Glasses of wine per week    Drug use: Never     Types: Marijuana    Sexual activity: Yes     Partners: Male     Birth control/protection: Condom       Nabila Mcmahon  reports that she quit smoking about 2 years ago. Her  smoking use included cigarettes. She has never been exposed to tobacco smoke. She has never used smokeless tobacco.       Breastfeeding No     PHYSICAL EXAM  Physical Exam   Constitutional: She is oriented to person, place, and time. She appears well-developed.   HENT:   Head: Normocephalic and atraumatic.   Nose: Nose normal.   Eyes: Lids are normal. Right eye exhibits no discharge. Left eye exhibits no discharge. Right conjunctiva is not injected. Left conjunctiva is not injected.   Pulmonary/Chest:  No respiratory distress.  Neurological: She is alert and oriented to person, place, and time. No cranial nerve deficit.   Psychiatric: She has a normal mood and affect. Her speech is normal and behavior is normal. Judgment and thought content normal.       Results for orders placed or performed in visit on 24   LIQUID-BASED PAP SMEAR, [facility] ([facility])    Collection Time: 24 11:19 AM    Specimen: ThinPrep Vial   Result Value Ref Range    Reference Lab Report       [facility]  [address] [place] [address]  Phone: [phone] or [phone]  Fax: [phone]  Alexi Barrett M.D., Medical Director    PATIENT NAME                           LABORATORY NO.  [id number]  NIKUNJ BAKER                            [id number]  [id number]                         AGE              SEX  SSN           CLIENT REF #  [facility]               2000  F    [id number]   [id number]    ROD MATIAS               REQUESTING M.DAlicia     ATTENDING M.D.     COPY TO.  [address], [facility] [address]    [place] [address]                DATE COLLECTED      DATE RECEIVED      DATE REPORTED  2024    ThinPrep Pap with [facility]    DIAGNOSIS:  Negative for intraepithelial lesion or malignancy    Multiple factors can influence accuracy of Pap tests; therefore, screening at  regular intervals is necessary  for early cancer detection.      SPECIMEN  ADEQUACY:  SATISFACTORY FOR EVALUATION  Transformation zone is present.  Sparse cellularity, minimal number of squamous cells available for evaluation.  SOURCE OF SPECIMEN:  CERVICAL/ENDOCERVICAL  SLIDES:  1  CLINICAL HISTORY:  Well woman exam with routine gynecological exam    Chlamydia / Gonorrhea  CHLAMYDIA TRACHOMATIS: Negative  NEISSERIA GONORRHOEAE: Negative    The Aptima Combo 2 assay is a target amplification nucleic acid probe test that  utilizes target capture for the in vitro qualitative detection and differentiation of  ribosomal RNA from Chlamydia trachomatis and Neisseria gonorrhoeae to aid  in the diagnosis of chlamydial and gonococcal disease using the [place] system.    Trichomonas  TRICHOMONAS VAGINALIS: Negative    The Aptima Trichomonas vaginalis assay is an in vitro qualitative nucleic acid  amplification test for the detection of ribosomal RNA to aid in the diagnosis of  trichomoniasis.    Sendout Ancillary Vaginosis Tests  GAYLA GLABRATA: Negative  CANDIDA  PARAPSILOSIS: Negative  CANDIDA TROPICALIS: Negative  MDL GAYLA ALBICANS: Positive    COMMENT: The above results are intended for medical diagnosis and treatment  purposes only.    Results from this assay (s) should be interpreted in conjunction with other  laboratory and clinical data. The test is not intended to differentiate carriers of  the organism from those with active infections. Therapeutic success or failure  cannot be assessed using this test because DNA may persist following  antimicrobial therapy. Nucleic acid base changes or mutations can result in false  negative reactions. This test was developed and its performance characteristics  determined by [facility] It has not been cleared  or approved by the U.S. Food and Drug Administration. The FDA has  determined that such clearance or approval is not necessary. [facility] is  located in [place], [place]    CYTOTECHNOLOGIST:      JORGE LUIS KENDRICK ([facility])    CPT CODES:   68271, 32588, 24065, 02886, 77481x4         Diagnoses and all orders for this visit:    1. History of motion sickness (Primary)    Other orders  -     Discontinue: dimenhyDRINATE (Dramamine) 50 MG tablet; Take 1 tablet by mouth Every 8 (Eight) Hours As Needed for Nausea.  Dispense: 24 tablet; Refill: 0  -     dimenhyDRINATE (Dramamine) 50 MG tablet; Take 1 tablet by mouth Every 8 (Eight) Hours As Needed for Nausea. Best to take 30-60 minutes prior to activity  Dispense: 24 tablet; Refill: 0    Dramamine has been ordered for you to take as directed in case of mo motion sickness  It is best to take this medication 30 to 60 minutes prior to the activity    FOLLOW-UP  If symptoms worsen or persist follow up with PCP, [facility] or Urgent Care    Patient verbalizes understanding of medication dosage, comfort measures, instructions for treatment and follow-up.    Almita Rae, ROD  04/11/2025  14:38 EDT    The use of a video visit has been reviewed with the patient and verbal informed consent has been obtained. Myself and Nabila Mcmahon participated in this visit. The patient is located in [address] [place] [address].    I am located in [place], [place]. [employer] and [employer] Client were utilized. I spent 22 minutes in the patient's chart for this visit.

## 2025-07-28 RX ORDER — VALACYCLOVIR HYDROCHLORIDE 500 MG/1
500 TABLET, FILM COATED ORAL DAILY
Qty: 90 TABLET | Refills: 0 | Status: SHIPPED | OUTPATIENT
Start: 2025-07-28

## 2025-08-11 ENCOUNTER — TELEPHONE (OUTPATIENT)
Dept: OBSTETRICS AND GYNECOLOGY | Facility: CLINIC | Age: 25
End: 2025-08-11
Payer: COMMERCIAL